# Patient Record
Sex: MALE | Race: WHITE | URBAN - METROPOLITAN AREA
[De-identification: names, ages, dates, MRNs, and addresses within clinical notes are randomized per-mention and may not be internally consistent; named-entity substitution may affect disease eponyms.]

---

## 2021-01-01 ENCOUNTER — HOSPITAL ENCOUNTER (EMERGENCY)
Age: 56
End: 2021-08-02
Attending: EMERGENCY MEDICINE
Payer: MEDICAID

## 2021-01-01 VITALS
TEMPERATURE: 95.5 F | DIASTOLIC BLOOD PRESSURE: 136 MMHG | BODY MASS INDEX: 27.28 KG/M2 | HEIGHT: 68 IN | WEIGHT: 180 LBS | SYSTOLIC BLOOD PRESSURE: 151 MMHG

## 2021-01-01 DIAGNOSIS — I46.9 CARDIAC ARREST (HCC): Primary | ICD-10-CM

## 2021-01-01 LAB — SARS-COV-2, NAAT: NOT DETECTED

## 2021-01-01 PROCEDURE — 96375 TX/PRO/DX INJ NEW DRUG ADDON: CPT

## 2021-01-01 PROCEDURE — 2500000003 HC RX 250 WO HCPCS: Performed by: EMERGENCY MEDICINE

## 2021-01-01 PROCEDURE — 87635 SARS-COV-2 COVID-19 AMP PRB: CPT

## 2021-01-01 PROCEDURE — 6360000002 HC RX W HCPCS: Performed by: EMERGENCY MEDICINE

## 2021-01-01 PROCEDURE — 99283 EMERGENCY DEPT VISIT LOW MDM: CPT

## 2021-01-01 PROCEDURE — 96374 THER/PROPH/DIAG INJ IV PUSH: CPT

## 2021-01-01 RX ORDER — CALCIUM CHLORIDE 100 MG/ML
1000 INJECTION INTRAVENOUS; INTRAVENTRICULAR ONCE
Status: COMPLETED | OUTPATIENT
Start: 2021-01-01 | End: 2021-01-01

## 2021-01-01 RX ORDER — EPINEPHRINE 0.1 MG/ML
1 SYRINGE (ML) INJECTION ONCE
Status: COMPLETED | OUTPATIENT
Start: 2021-01-01 | End: 2021-01-01

## 2021-01-01 RX ADMIN — EPINEPHRINE 1 MG: 0.1 INJECTION, SOLUTION ENDOTRACHEAL; INTRACARDIAC; INTRAVENOUS at 14:07

## 2021-01-01 RX ADMIN — SODIUM BICARBONATE 50 MEQ: 84 INJECTION, SOLUTION INTRAVENOUS at 14:08

## 2021-01-01 RX ADMIN — CALCIUM CHLORIDE 1000 MG: 100 INJECTION INTRAVENOUS; INTRAVENTRICULAR at 14:09

## 2021-08-02 NOTE — ED NOTES
1L NS, 2mg of epi and 2mg Narcan given by harman Goyal, NOEMI  08/02/21 Isabell 37, RN  08/02/21 [id number]

## 2021-08-02 NOTE — ED NOTES
Pt arrived [phone] with BRINA on and being bagged. Intubation in successful with EMS. Asystole on monitor.   20g left AC.   1407- epi 1mg IV  1408- Bicarb 1 amp  1409 Calcium Chloride 1gm  1410 rate check, US no cardiac activity  1412- DEREK Jackson, RN  08/02/21 1420

## 2021-08-02 NOTE — ED NOTES
Pt brother states that the mother has decided that she wants to see pt  [place] [facility] [place]      Elisabet Blakely, NOEMI  08/02/21 [id number]

## 2021-08-02 NOTE — ED PROVIDER NOTES
[address] [facility] ED  eMERGENCYdEPARTMENT eNCOUnter      Pt Name: Rhiannon Underwood  MRN: [id number]  [place] 1965  Date of evaluation: 8/2/2021  Jay Shelley MD    CHIEF COMPLAINT           HPI  Rhiannon Underwood is a 50 y.o. male per chart review has unknown pmh presents to the ED in cardiac arrest.  Per family, pt has had a couple falls today. Pt found on the stairs and noted to be unresponsive. EMS arrived and pt noted to be in asystole. Pt could not be intubated by EMS. Pt given IV epi, IV narcan per EMS. Total down time was 20 min prior to arrival.      ROS  Review of Systems   Unable to perform ROS: Acuity of condition       Except as noted above the remainder of the review of systems was reviewed and negative. PAST MEDICAL HISTORY   No past medical history on file. SURGICAL HISTORY     No past surgical history on file. CURRENTMEDICATIONS       Previous Medications    No medications on file       ALLERGIES     Patient has no allergy information on record. FAMILY HISTORY     No family history on file. SOCIAL HISTORY       Social History     Socioeconomic History    Marital status: Not on file     Spouse name: Not on file    Number of children: Not on file    Years of education: Not on file    Highest education level: Not on file   Occupational History    Not on file   Tobacco Use    Smoking status: Not on file   Substance and Sexual Activity    Alcohol use: Not on file    Drug use: Not on file    Sexual activity: Not on file   Other Topics Concern    Not on file   Social History Narrative    Not on file     Social Determinants of Health     Financial Resource Strain:     Difficulty of Paying Living Expenses:    Food Insecurity:     Worried About Running Out of Food in the Last Year:     [address] in the Last Year:    Transportation Needs:     Lack of Transportation (Medical):      Lack of Transportation (Non-Medical):    Physical Activity:  Days of Exercise per Week:     Minutes of Exercise per Session:    Stress:     Feeling of Stress :    Social Connections:     Frequency of Communication with Friends and Family:     Frequency of Social Gatherings with Friends and Family:     Attends [employer] Services:     Active Member of Clubs or Organizations:     Attends Club or Organization Meetings:     Marital Status:    Intimate Partner Violence:     Fear of Current or Ex-Partner:     Emotionally Abused:     Physically Abused:     Sexually Abused:          PHYSICAL EXAM       ED Triage Vitals [21 1404]   BP Temp Temp Source Pulse Resp SpO2 Height Weight   (!) 151/136 95.5 °F (35.3 °C) Temporal (!) 0 (!) 0 -- -- --       Physical Exam  Vitals and nursing note reviewed. Constitutional:       Appearance: He is well-developed. Comments: Unresponsive, GCS 3   HENT:      Head: Normocephalic. Right Ear: External ear normal.      Left Ear: External ear normal.   Eyes:      Comments: Pupils dilated, unresponsive to light   Cardiovascular:      Comments: Pulseless  Pulmonary:      Comments: No spontaneous respirations  Abdominal:      General: There is no distension. Palpations: Abdomen is soft. Musculoskeletal:         General: Normal range of motion. Cervical back: Neck supple. Skin:     General: Skin is warm and dry. Neurological:      Comments: GCS 3   Psychiatric:      Comments: Unable to obtain           MDM  49 yo male presents to the ED with cardiac arrest.  Pt is afebrile, hemodynamically stable. Pt found by EMS to be in asystole. Pt could not be intubated. Pt given IV epi, IV bicarb per EMS. ACLS continued per protocol. ROSC unable to be obtained. Pt  in the ED. FINAL IMPRESSION      1.  Cardiac arrest [facility])          DISPOSITION/PLAN   DISPOSITION  2021 02:20:20 PM        DISCHARGE MEDICATIONS:  [unfilled]         Christine Bee MD(electronically signed)  Attending Emergency Physician            Jarvis Tolliver MD  08/02/21 2164

## 2021-08-02 NOTE — ED NOTES
Tried to call contact brother to see if mother was still coming to see pt  No answer     Blake Moss, RN  08/02/21 1529

## 2021-08-02 NOTE — ED NOTES
Upon getting off of phone with [facility], went into pt room to inform  that he could possible be a donor,  stated that it was too late to use the eyes.       Tarun RAWLS RN  08/02/21 Tc FRANKLIN  08/02/21 3870